# Patient Record
Sex: FEMALE | Race: WHITE | NOT HISPANIC OR LATINO | Employment: OTHER | URBAN - METROPOLITAN AREA
[De-identification: names, ages, dates, MRNs, and addresses within clinical notes are randomized per-mention and may not be internally consistent; named-entity substitution may affect disease eponyms.]

---

## 2019-12-27 ENCOUNTER — APPOINTMENT (OUTPATIENT)
Dept: RADIOLOGY | Facility: MEDICAL CENTER | Age: 69
End: 2019-12-27
Payer: MEDICARE

## 2019-12-27 ENCOUNTER — APPOINTMENT (OUTPATIENT)
Dept: RADIOLOGY | Facility: MEDICAL CENTER | Age: 69
End: 2019-12-27
Attending: EMERGENCY MEDICINE
Payer: MEDICARE

## 2019-12-27 ENCOUNTER — HOSPITAL ENCOUNTER (EMERGENCY)
Facility: MEDICAL CENTER | Age: 69
End: 2019-12-27
Attending: EMERGENCY MEDICINE
Payer: MEDICARE

## 2019-12-27 VITALS
OXYGEN SATURATION: 97 % | HEART RATE: 97 BPM | BODY MASS INDEX: 26.45 KG/M2 | DIASTOLIC BLOOD PRESSURE: 90 MMHG | TEMPERATURE: 96.8 F | HEIGHT: 65 IN | RESPIRATION RATE: 18 BRPM | WEIGHT: 158.73 LBS | SYSTOLIC BLOOD PRESSURE: 133 MMHG

## 2019-12-27 DIAGNOSIS — S82.002A CLOSED NONDISPLACED FRACTURE OF LEFT PATELLA, UNSPECIFIED FRACTURE MORPHOLOGY, INITIAL ENCOUNTER: ICD-10-CM

## 2019-12-27 PROCEDURE — 99284 EMERGENCY DEPT VISIT MOD MDM: CPT

## 2019-12-27 PROCEDURE — A9270 NON-COVERED ITEM OR SERVICE: HCPCS | Performed by: EMERGENCY MEDICINE

## 2019-12-27 PROCEDURE — 700102 HCHG RX REV CODE 250 W/ 637 OVERRIDE(OP): Performed by: EMERGENCY MEDICINE

## 2019-12-27 PROCEDURE — 73564 X-RAY EXAM KNEE 4 OR MORE: CPT | Mod: LT

## 2019-12-27 RX ORDER — HYDROCODONE BITARTRATE AND ACETAMINOPHEN 5; 325 MG/1; MG/1
1 TABLET ORAL EVERY 4 HOURS PRN
Qty: 15 TAB | Refills: 0 | Status: SHIPPED | OUTPATIENT
Start: 2019-12-27 | End: 2019-12-30

## 2019-12-27 RX ORDER — ACETAMINOPHEN 325 MG/1
650 TABLET ORAL ONCE
Status: COMPLETED | OUTPATIENT
Start: 2019-12-27 | End: 2019-12-27

## 2019-12-27 RX ADMIN — ACETAMINOPHEN 650 MG: 325 TABLET, FILM COATED ORAL at 21:47

## 2019-12-27 SDOH — HEALTH STABILITY: MENTAL HEALTH: HOW OFTEN DO YOU HAVE A DRINK CONTAINING ALCOHOL?: 2-4 TIMES A MONTH

## 2019-12-28 NOTE — ED TRIAGE NOTES
"Kelsey Villaseñor  69 y.o. female  Chief Complaint   Patient presents with   • T-5000 GLF     Pt tripped on the curb, +FOOSH Right hand, -LOC   • Knee Pain     Left knee from impact of the fall.       Pt amb to triage with steady gait for above complaint.  Pt is alert and oriented, speaking in full sentences, follows commands and responds appropriately to questions. NAD. Resp are even and unlabored.  Pt placed in lobby. Pt educated on triage process. Pt encouraged to alert staff for any changes.  /90   Pulse 97   Temp 36 °C (96.8 °F) (Oral)   Resp 18   Ht 1.651 m (5' 5\")   Wt 72 kg (158 lb 11.7 oz)   SpO2 97%   BMI 26.41 kg/m²     "

## 2019-12-28 NOTE — ED NOTES
CMS intact post knee immobilizer placement. Crutches instructions given. All questions answered.  Patient verbalized understanding to plan of care and discharge information. Patient in stable condition. No signs of distress. Patient pain free. Patient wheeled out of ED to personal vehicle with family.

## 2019-12-28 NOTE — ED PROVIDER NOTES
"ED Provider Note    Scribed for Fercho Hurst M.D. by Ross Aleman. 12/27/2019, 9:14 PM.    Primary care provider: None reported   Means of arrival: Wheel chair  History obtained from: Patient  History limited by: None    CHIEF COMPLAINT  Chief Complaint   Patient presents with   • T-5000 GLF     Pt tripped on the curb, +FOOSH Right hand, -LOC   • Knee Pain     Left knee from impact of the fall.       HPI  Kelsey Villaseñor is a 69 y.o. female who presents to the Emergency Department who presents to the ED for left knee pain onset just prior to arrival. The patient states she accidentally tripped over a curb and landed on her left knee with her full body weight. There was no loss of consciousness. The patient has been unable to bear weight or ambulate without greatly exacerbating the left knee pain. Patient has taken some ibuprofen with little to no alleviation of her symptoms. Patient notes her hips are slightly sore, which she believes is secondary to the fall. No other musculoskeletal pains or symptoms were reported at this time.    REVIEW OF SYSTEMS  See HPI for further details.    PAST MEDICAL HISTORY   None reported     SURGICAL HISTORY  patient denies any surgical history    SOCIAL HISTORY  Social History     Tobacco Use   • Smoking status: Never Smoker   • Smokeless tobacco: Never Used   Substance Use Topics   • Alcohol use: Yes     Frequency: 2-4 times a month   • Drug use: Not Currently      Social History     Substance and Sexual Activity   Drug Use Not Currently       FAMILY HISTORY  History reviewed. No pertinent family history.    CURRENT MEDICATIONS  Reviewed.  See Encounter Summary.     ALLERGIES  No Known Allergies    PHYSICAL EXAM  VITAL SIGNS: /90   Pulse 97   Temp 36 °C (96.8 °F) (Oral)   Resp 18   Ht 1.651 m (5' 5\")   Wt 72 kg (158 lb 11.7 oz)   SpO2 97%   BMI 26.41 kg/m²   Constitutional: Alert in no apparent distress.  HENT: Normocephalic, Atraumatic, Bilateral external ears normal. " Nose normal.   Eyes: Pupils are equal and reactive. Conjunctiva normal, non-icteric.   Heart: Regular rate and rythm, no murmurs.    Lungs: Clear to auscultation bilaterally.  Skin: Warm, Dry, No erythema, No rash.   Extremities: Held at 90 degree flexion in position of comfort. No joint laxity with anterior and posterior drawer. Hip, and ankle nontender with full range of motion.   Neurologic: Alert, Grossly non-focal.   Psychiatric: Affect normal, Judgment normal, Mood normal, Appears appropriate and not intoxicated.     DIAGNOSTIC STUDIES / PROCEDURES     RADIOLOGY  DX-KNEE COMPLETE 4+ LEFT   Final Result         Acute comminuted mildly displaced fracture of the patella.      Lipohemarthrosis        The radiologist's interpretation of all radiological studies have been reviewed by me.    COURSE & MEDICAL DECISION MAKING  Nursing notes, VS, PMSFHx reviewed in chart.    9:14 PM - Patient seen and examined at bedside. I informed the patient the need for radiology to rule out any emergent processes. Currently awaiting radiology results before deciding if intervention is necessary. Patient verbalizes understanding and agreement to this plan of care. Patient will be treated with tylenol 650mg. Ordered DX-lmee 2- left, DX-knee complete 4+ left to evaluate her symptoms.      10:05 PM-Patient was reevaluated at bedside. Discussed radiology results with the patient and informed them that there is a mildly displaced fracture of the patella. The plan for discharge was discussed with the patient. Patient was given return precautions and welcomed to return to the ED. Patient understood and verbalized agreement.       Decision Making:  This is a 69 y.o. year old female who presents with above complaint of mechanical ground-level fall.  Unfortunately x-ray imaging does confirm a patellar fracture.  Remaining bony structures of the knee are well-appearing.  There is evidence of a lipohemarthrosis.  However I do believe this is  mild clinically.  Patient has been placed in a knee immobilizer and provided with crutches.  She is additionally been provided with Norco for additional breakthrough pain control but she states that she will primarily use Tylenol and NSAIDs as well as ice and rest.  She has been referred to orthopedics for outpatient follow-up.    DISPOSITION:  Patient will be discharged home in good condition.    Discharge Medications:  New Prescriptions    HYDROCODONE-ACETAMINOPHEN (NORCO) 5-325 MG TAB PER TABLET    Take 1 Tab by mouth every four hours as needed for up to 3 days.       The patient was discharged home (see d/c instructions) and told to return immediately for any signs or symptoms listed, or any worsening at all.  The patient verbally agreed to the discharge precautions and follow-up plan which is documented in EPIC.      FINAL IMPRESSION  1. Closed nondisplaced fracture of left patella, unspecified fracture morphology, initial encounter          Ross THOMAS (Scribe), am scribing for, and in the presence of, Fercho Hurst M.D..    Electronically signed by: Ross Aleman (Nickibe), 12/27/2019    Fercho THOMAS M.D. personally performed the services described in this documentation, as scribed by Ross Aleman in my presence, and it is both accurate and complete. E.    The note accurately reflects work and decisions made by me.  Fercho Hurst  12/28/2019  7:30 PM

## 2021-03-03 DIAGNOSIS — Z23 NEED FOR VACCINATION: ICD-10-CM
